# Patient Record
Sex: MALE | Race: WHITE
[De-identification: names, ages, dates, MRNs, and addresses within clinical notes are randomized per-mention and may not be internally consistent; named-entity substitution may affect disease eponyms.]

---

## 2019-03-05 ENCOUNTER — HOSPITAL ENCOUNTER (OUTPATIENT)
Dept: HOSPITAL 89 - US | Age: 44
End: 2019-03-05
Attending: FAMILY MEDICINE
Payer: COMMERCIAL

## 2019-03-05 DIAGNOSIS — R93.9: ICD-10-CM

## 2019-03-05 DIAGNOSIS — K76.0: Primary | ICD-10-CM

## 2019-03-05 PROCEDURE — 76705 ECHO EXAM OF ABDOMEN: CPT

## 2019-03-05 NOTE — RADIOLOGY IMAGING REPORT
FACILITY: [facility] 

 

PATIENT NAME: Garrett Farrar

: 1975

MR: [id number]

V: [id number]

EXAM DATE: 

ORDERING PHYSICIAN: MARLENI BELL

TECHNOLOGIST: 

 

Location: [facility]

Patient: Garrett Farrar

: 1975

MRN: [id number]

Visit/Account:[id number]

Date of Sevice:  3/05/2019

 

ACCESSION #: [id number]

 

LIVER

 

HISTORY:  Right upper quadrant tenderness

 

COMPARISON: None

 

 

 

 

FINDINGS:

 

Gallbladder: Unremarkable; no stones or sludge.

 

Liver: Liver is enlarged measuring 20.3 cm in length.  There is increased echogenicity throughout the
 liver which can be seen with fatty infiltration or other infiltrative process

 

Common duct: Normal, 3.6 mm diameter.

 

Pancreas: Not well seen due to overlying bowel gas

 

Right kidney: The right kidney was not ideally seen due to patient's body habitus.  There is a lobula
r contour to the right kidney.  There is a suggestion of a possible anterior cortical mass measuring 
approximately 2.7 cm in diameter cm in diameter.  The right kidney measures 9.1 cm in length

 

Upper abdominal aorta and IVC: Patent.

 

Ascites: None visualized.

 

IMPRESSION:

The liver is enlarged measuring 20.3 cm in length

 

Increased echogenicity throughout liver which can be seen with fatty infiltration or other infiltrati
ve process

 

Pancreas not well seen due to overlying bowel gas

 

The right kidney was not ideally seen due to patient's body habitus.  There is a lobular contour to t
he right kidney with suggestion of a possible anterior cortical mass.  This could be further evaluate
d with CT or MR depending upon the degree of clinical concern

 

Report Dictated By: Debbie Dc MD at 3/5/2019 9:37 AM

 

Report E-Signed By: Debbie Dc MD  at 3/5/2019 9:40 AM

 

WSN:[id number]

## 2019-03-27 ENCOUNTER — HOSPITAL ENCOUNTER (OUTPATIENT)
Dept: HOSPITAL 89 - LAB | Age: 44
LOS: 15 days | End: 2019-04-11
Attending: FAMILY MEDICINE
Payer: COMMERCIAL

## 2019-03-27 DIAGNOSIS — R10.811: Primary | ICD-10-CM

## 2019-03-27 PROCEDURE — 82565 ASSAY OF CREATININE: CPT

## 2019-03-27 PROCEDURE — 36415 COLL VENOUS BLD VENIPUNCTURE: CPT

## 2019-03-28 ENCOUNTER — HOSPITAL ENCOUNTER (OUTPATIENT)
Dept: HOSPITAL 89 - CT | Age: 44
End: 2019-03-28
Attending: FAMILY MEDICINE
Payer: COMMERCIAL

## 2019-03-28 DIAGNOSIS — N28.89: ICD-10-CM

## 2019-03-28 DIAGNOSIS — R91.8: ICD-10-CM

## 2019-03-28 DIAGNOSIS — K40.20: ICD-10-CM

## 2019-03-28 DIAGNOSIS — K76.0: ICD-10-CM

## 2019-03-28 DIAGNOSIS — K42.9: ICD-10-CM

## 2019-03-28 DIAGNOSIS — N20.0: Primary | ICD-10-CM

## 2019-03-28 PROCEDURE — 74177 CT ABD & PELVIS W/CONTRAST: CPT

## 2019-03-28 NOTE — RADIOLOGY IMAGING REPORT
[place] [facility] [place] 

 

PATIENT NAME: Garrett Farrar

: 1975

MR: [id number]

V: [id number]

EXAM DATE: 

ORDERING PHYSICIAN: MARLENI BELL

TECHNOLOGIST: 

 

Location: [facility] [place]

Patient: Garrett Farrar

: 1975

MRN: [id number]

Visit/Account:[id number]

Date of Sevice:  3/28/2019

 

ACCESSION #: [id number]

 

EXAMINATION:

CT abdomen with IV contrast

CT pelvis with IV contrast

 

HISTORY:   Possible kidney mass.

 

TECHNIQUE:   Spiral scan was through the abdomen and pelvis during injection of nonionic iodinated in
travenous contrast.  Sagittal and coronal reformatted images are also submitted.

 

One of the following dose optimization techniques was utilized in the performance of this exam: Autom
ated exposure control; adjustment of the mA and/or kV according to the patient's size; or use of an i
terative  reconstruction technique.  Specific details can be referenced in the facility's radiology C
T exam operational policy.

 

CONTRAST:   75 mL of IV Isovue-370

 

COMPARISON:   Right upper quadrant ultrasound dated 3/5/2019.

 

FINDINGS:

 

Lower chest: Mild patchy opacities in the lung bases measuring up to 2.2 x 1.6 cm on the right.

 

Liver / biliary: Hepatic steatosis.

 

Pancreas: Negative.

 

Spleen: Negative.

 

Adrenal glands: Negative.

 

Kidneys: 1.2 cm hypodensity in the anterior right kidney with a density of 34 Hounsfield units (serie
s 2, image 73).  Bilateral punctate nonobstructing stones.  Subcentimeter exophytic cyst extending la
terally from the upper left kidney.

 

Pelvic  structures: Negative.

 

Bowel: Negative.

 

Peritoneum / retroperitoneum / mesenteries: Negative.

 

Vessels: Negative.

 

Lymph nodes: Negative.

 

Musculoskeletal / Body wall: Small bilateral fat-containing inguinal hernias.  Small fat-containing u
mbilical hernia.  Mild degenerative changes in the spine.  Partially imaged right hip arthroplasty.  
Mild left hip osteoarthritis.

 

IMPRESSION:

 

1.  1.2 cm indeterminate lesion in the anterior right kidney with a density of 34 Hounsfield units (s
eries 2, image 73).  Follow-up renal mass protocol CT or MRI with IV contrast in 6 months is recommen
ded.

 

2.  Bilateral punctate nonobstructing kidney stones.

 

3.  Mild patchy opacities in the lung bases measuring up to 2.2 x 1.6 cm on the right.  This is proba
dayron scarring or atelectasis.  Follow-up chest CT in 3-6 months is recommended.

 

4.  Hepatic steatosis.

 

5.  Small bilateral fat-containing inguinal hernias.  Small fat-containing umbilical hernia.

 

Report Dictated By: Elias Higgins MD at 3/28/2019 2:28 PM

 

Report E-Signed By: Elias Higgins MD  at 3/28/2019 3:09 PM

 

WSN:[id number]